# Patient Record
Sex: FEMALE | Race: WHITE | NOT HISPANIC OR LATINO | Employment: PART TIME | URBAN - METROPOLITAN AREA
[De-identification: names, ages, dates, MRNs, and addresses within clinical notes are randomized per-mention and may not be internally consistent; named-entity substitution may affect disease eponyms.]

---
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
## 2024-07-24 ENCOUNTER — TELEPHONE (OUTPATIENT)
Dept: NURSING | Facility: CLINIC | Age: 19
End: 2024-07-24

## 2024-07-24 ENCOUNTER — HOSPITAL ENCOUNTER (EMERGENCY)
Facility: CLINIC | Age: 19
Discharge: HOME | End: 2024-07-24
Attending: EMERGENCY MEDICINE | Admitting: EMERGENCY MEDICINE

## 2024-07-24 VITALS
SYSTOLIC BLOOD PRESSURE: 103 MMHG | OXYGEN SATURATION: 100 % | BODY MASS INDEX: 41.54 KG/M2 | DIASTOLIC BLOOD PRESSURE: 75 MMHG | WEIGHT: 220 LBS | HEIGHT: 61 IN | RESPIRATION RATE: 16 BRPM | TEMPERATURE: 98.4 F | HEART RATE: 89 BPM

## 2024-07-24 DIAGNOSIS — Z20.2 POSSIBLE EXPOSURE TO STI: ICD-10-CM

## 2024-07-24 LAB
C TRACH DNA SPEC QL NAA+PROBE: NEGATIVE
CLUE CELLS: NORMAL
HCG UR QL: NEGATIVE
N GONORRHOEA DNA SPEC QL NAA+PROBE: POSITIVE
TRICHOMONAS, WET PREP: NORMAL
WBC'S/HIGH POWER FIELD, WET PREP: NORMAL
YEAST, WET PREP: NORMAL

## 2024-07-24 PROCEDURE — 99284 EMERGENCY DEPT VISIT MOD MDM: CPT

## 2024-07-24 PROCEDURE — 250N000011 HC RX IP 250 OP 636: Performed by: EMERGENCY MEDICINE

## 2024-07-24 PROCEDURE — 250N000009 HC RX 250: Performed by: EMERGENCY MEDICINE

## 2024-07-24 PROCEDURE — 87591 N.GONORRHOEAE DNA AMP PROB: CPT | Performed by: EMERGENCY MEDICINE

## 2024-07-24 PROCEDURE — 87491 CHLMYD TRACH DNA AMP PROBE: CPT | Performed by: EMERGENCY MEDICINE

## 2024-07-24 PROCEDURE — 96372 THER/PROPH/DIAG INJ SC/IM: CPT | Performed by: EMERGENCY MEDICINE

## 2024-07-24 PROCEDURE — 87210 SMEAR WET MOUNT SALINE/INK: CPT | Performed by: EMERGENCY MEDICINE

## 2024-07-24 PROCEDURE — 81025 URINE PREGNANCY TEST: CPT | Performed by: EMERGENCY MEDICINE

## 2024-07-24 RX ORDER — DOXYCYCLINE 100 MG/1
100 CAPSULE ORAL 2 TIMES DAILY
Qty: 14 CAPSULE | Refills: 0 | Status: SHIPPED | OUTPATIENT
Start: 2024-07-24 | End: 2024-07-31

## 2024-07-24 RX ADMIN — LIDOCAINE HYDROCHLORIDE 500 MG: 10 INJECTION, SOLUTION EPIDURAL; INFILTRATION; INTRACAUDAL; PERINEURAL at 07:53

## 2024-07-24 ASSESSMENT — ACTIVITIES OF DAILY LIVING (ADL)
ADLS_ACUITY_SCORE: 35
ADLS_ACUITY_SCORE: 33

## 2024-07-24 ASSESSMENT — COLUMBIA-SUICIDE SEVERITY RATING SCALE - C-SSRS
2. HAVE YOU ACTUALLY HAD ANY THOUGHTS OF KILLING YOURSELF IN THE PAST MONTH?: NO
6. HAVE YOU EVER DONE ANYTHING, STARTED TO DO ANYTHING, OR PREPARED TO DO ANYTHING TO END YOUR LIFE?: NO
1. IN THE PAST MONTH, HAVE YOU WISHED YOU WERE DEAD OR WISHED YOU COULD GO TO SLEEP AND NOT WAKE UP?: NO

## 2024-07-24 NOTE — TELEPHONE ENCOUNTER
"[facility]    Reason for call: Lab Result Notification     Lab Result (including Rx patient on, if applicable).  If culture, copy of lab report at bottom.  Lab Result:   Component      Latest Ref Rng 7/24/2024  7:26 AM   N Gonorrhea PCR      Negative  Positive !       Legend:  ! Abnormal    Creatinine Level (mg/dl) No results found for: \"CR\" Creatinine clearance (ml/min), if applicable    Creatinine clearance cannot be calculated (No successful lab value found.)     RN Recommendations/Instructions per [place] ED lab result protocol:   [facility] ED lab result protocol utilized: Gonorrhea      Patient's current Symptoms:   Spoke with the patient. Advised of results. Patient treated in the ED.     Patient/care giver notified to contact your PCP clinic or return to the Emergency department if your:  Symptoms return.    FATOUMATA WASHINGTON, RN  "

## 2024-07-24 NOTE — TELEPHONE ENCOUNTER
[facility]    Reason for call: Curious if she is still supposed to take the Doxycycline    Lab Result (including Rx patient on, if applicable).  If culture, copy of lab report at bottom.  Lab Result:   Component      Latest Ref Rng 7/24/2024  7:26 AM   Chlamydia Trachomatis PCR      Negative  Negative    N Gonorrhea PCR      Negative  Positive !       Legend:  ! Abnormal    ED Rx on 7/24/24:  Ceftriaxone (Rocephin) 500 mg IM x 1 AND Doxycycline 100 mg PO  BID for 7 days    RN Recommendations/Instructions per [place] ED lab result protocol:   [facility] ED lab result protocol utilized: chlamydia T  Take antibiotics as prescribed      Broderick Foss RN

## 2024-07-24 NOTE — ED PROVIDER NOTES
"  Emergency Department Note      History of Present Illness     Chief Complaint   Exposure to STD    HPI   Waleska Mcclure is a 18 year old female with a history of gonorrhea and chlamydia who presents with concern for STI. She adds her partner has had some penile discomfort and she wanted to come in to get checked. She had sexual contact last night. She has a history of gonorrhea a few years ago and chlamydia last month and was on Doxycycline. No vaginal discharge or dysuria, no other symptoms. She is on birth control and has not had a period in a while. She denies any symptoms. She denies allergies.     Independent Historian   None    Review of External Notes   None    Past Medical History     Medical History and Problem List   ADHD  Chlamydia   Gonorrhea     Medications   Dexmethylphenidate HCl    Physical Exam     Patient Vitals for the past 24 hrs:   BP Temp Pulse Resp SpO2 Height Weight   07/24/24 0642 (!) 145/78 98.4  F (36.9  C) 84 20 99 % 1.549 m (5' 1\") 99.8 kg (220 lb)     Physical Exam  Physical Exam   Constitutional:  Patient is oriented to person, place, and time. They appear well-developed and well-nourished.   Musculoskeletal:  Normal range of motion. Patient exhibits no edema.   Neurological:   Patient is alert and oriented to person, place, and time. Patient has normal strength. No cranial nerve deficit or sensory deficit. GCS 15  Skin:   Skin is warm and dry. No rash noted. No erythema.   Psychiatric:   Patient has a normal mood and affect. Patient's behavior is normal. Judgment and thought content normal.      Diagnostics     Lab Results   Labs Ordered and Resulted from Time of ED Arrival to Time of ED Departure   HCG QUALITATIVE URINE - Normal       Result Value    hCG Urine Qualitative Negative     WET PREPARATION - Normal    Trichomonas Absent      Yeast Absent      Clue Cells Absent      WBCs/high power field None     CHLAMYDIA TRACHOMATIS PCR   NEISSERIA GONORRHOEAE PCR     Independent " Interpretation   None    ED Course      Medications Administered   Medications   cefTRIAXone (ROCEPHIN) 500 mg in lidocaine injection (500 mg Intramuscular $Given 7/24/24 0754)     Discussion of Management   None    ED Course   ED Course as of 07/24/24 0808   Wed Jul 24, 2024   0655 I obtained the patient's history and examined as noted above.      Optional/Additional Documentation  None    Medical Decision Making / Diagnosis     CMS Diagnoses: None    MIPS       None    MDM   Waleska Mcclure is a 18 year old female who presents with concerns for potential STD exposure. She was treated empirically with Rocephin here in the ED. WILL ALSO TREAT EMPIRICALLY FOR CHLAMYDIA. PCR pending for chlamydia and gonorrhea. Additionally, she was informed that she needs to abstain from sexual activity until cleared at follow up and for at least 14 days.  Additionally wet prep was negative for BV yeast and trichomonas.  She was also told that results will come back at a later date and he will be contacted only if positive but that she was fully treated.     Disposition   The patient was discharged.     Diagnosis     ICD-10-CM    1. Possible exposure to STI  Z20.2          Discharge Medications   New Prescriptions    DOXYCYCLINE HYCLATE (VIBRAMYCIN) 100 MG CAPSULE    Take 1 capsule (100 mg) by mouth 2 times daily for 7 days     Scribe Disclosure:  I, Gini Washington, am serving as a scribe at 6:56 AM on 7/24/2024 to document services personally performed by Brigitte Gorman MD based on my observations and the provider's statements to me.      Brigitte Gorman MD Bochert, Michelle Ann, MD  07/24/24 0812

## 2024-07-24 NOTE — LETTER
July 24, 2024      To Whom It May Concern:      Waleska Mcclure was seen in our Emergency Department today, 07/24/24.  I expect her condition to improve over the next day.  She may return to work when improved.    Sincerely,        Vy FRIAS RN

## 2024-07-24 NOTE — ED TRIAGE NOTES
Patient here for STD check . She denies having any symptoms.     Triage Assessment (Adult)       Row Name 07/24/24 0643          Triage Assessment    Airway WDL WDL        Respiratory WDL    Respiratory WDL WDL        Skin Circulation/Temperature WDL    Skin Circulation/Temperature WDL WDL        Cardiac WDL    Cardiac WDL WDL        Peripheral/Neurovascular WDL    Peripheral Neurovascular WDL WDL        Cognitive/Neuro/Behavioral WDL    Cognitive/Neuro/Behavioral WDL WDL